# Patient Record
Sex: MALE | Race: WHITE | NOT HISPANIC OR LATINO | Employment: STUDENT | URBAN - NONMETROPOLITAN AREA
[De-identification: names, ages, dates, MRNs, and addresses within clinical notes are randomized per-mention and may not be internally consistent; named-entity substitution may affect disease eponyms.]

---
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
## 2023-04-03 ENCOUNTER — TELEPHONE (OUTPATIENT)
Dept: OBGYN CLINIC | Facility: HOSPITAL | Age: 18
End: 2023-04-03

## 2023-04-03 ENCOUNTER — APPOINTMENT (OUTPATIENT)
Dept: RADIOLOGY | Facility: CLINIC | Age: 18
End: 2023-04-03

## 2023-04-03 ENCOUNTER — OFFICE VISIT (OUTPATIENT)
Dept: URGENT CARE | Facility: CLINIC | Age: 18
End: 2023-04-03

## 2023-04-03 VITALS
WEIGHT: 135 LBS | BODY MASS INDEX: 20.46 KG/M2 | HEIGHT: 68 IN | HEART RATE: 70 BPM | RESPIRATION RATE: 18 BRPM | DIASTOLIC BLOOD PRESSURE: 61 MMHG | TEMPERATURE: 97.8 F | SYSTOLIC BLOOD PRESSURE: 124 MMHG | OXYGEN SATURATION: 100 %

## 2023-04-03 DIAGNOSIS — M79.642 HAND PAIN, LEFT: ICD-10-CM

## 2023-04-03 DIAGNOSIS — S63.616A SPRAIN OF RIGHT LITTLE FINGER, UNSPECIFIED SITE OF DIGIT, INITIAL ENCOUNTER: Primary | ICD-10-CM

## 2023-04-03 RX ORDER — ISOTRETINOIN 30 MG/1
30 CAPSULE ORAL 2 TIMES DAILY
COMMUNITY
Start: 2023-03-14

## 2023-04-03 NOTE — TELEPHONE ENCOUNTER
Patient is being referred to a orthopedics  Please schedule accordingly      [address]   [phone]

## 2023-04-03 NOTE — PATIENT INSTRUCTIONS
Rest, ice, compression, elevation  Finger splint or andriy tape  Gentle range of motion  Tylenol ibuprofen for pain  Orthopedics if symptoms or not improving

## 2023-04-03 NOTE — PROGRESS NOTES
[facility]        NAME: Lalitha Shelton is a 21 y o  male  : 2005    MRN: [id number]  DATE: April 3, 2023  TIME: 7:44 PM    Assessment and Plan   Sprain of right little finger, unspecified site of digit, initial encounter [H51 525F]  1  Sprain of right little finger, unspecified site of digit, initial encounter  Ambulatory Referral to Orthopedic Surgery    Orthopedic injury treatment    CANCELED: XR hand 3+ vw left            Patient Instructions   Rest, ice, compression, elevation  Finger splint or andriy tape  Gentle range of motion  Tylenol ibuprofen for pain  Orthopedics if symptoms or not improving  Follow up with PCP in 3-5 days  Proceed to  ER if symptoms worsen  Chief Complaint     Chief Complaint   Patient presents with   • Hand Injury     Pt reports playing football yesterday and had a friend accidentally kick hit left ring and pinky fingers  Today has pain with movement and tingling  History of Present Illness       Patient is a 59-year-old male with no significant past medical history presents the office complaining of right fifth digit pain, swelling, ecchymosis after playing football yesterday  States his friend actually kicked him in the hand  Unsure if it caused it to hyperextend, flex, or bend and an abnormal way  Pain is rated 6 out of 10 most noticeable to the proximal aspect  Reports occasional tingling without pain in his fourth digit  Not take anything for pain  He is right-hand dominant  Review of Systems   Review of Systems   Musculoskeletal: Positive for arthralgias and joint swelling  Neurological: Positive for numbness           Current Medications       Current Outpatient Medications:   •  ISOtretinoin (ACCUTANE) 30 MG capsule, Take 30 mg by mouth 2 (two) times a day, Disp: , Rfl:   •  clindamycin (CLINDAGEL) 1 % gel, Apply topically 2 (two) times a day (Patient not taking: Reported on 4/3/2023), Disp: , Rfl:   •  DOXYCYCLINE PO, Take by "mouth (Patient not taking: Reported on 4/3/2023), Disp: , Rfl:     Current Allergies     Allergies as of 04/03/2023 - Reviewed 04/03/2023   Allergen Reaction Noted   • Amoxicillin Rash 11/26/2019            The following portions of the patient's history were reviewed and updated as appropriate: allergies, current medications, past family history, past medical history, past social history, past surgical history and problem list      Past Medical History:   Diagnosis Date   • Arm fracture, left    • Foot fracture    • Strep throat        Past Surgical History:   Procedure Laterality Date   • TONSILLECTOMY         History reviewed  No pertinent family history  Medications have been verified  Objective   /61   Pulse 70   Temp 97 8 °F (36 6 °C)   Resp 18   Ht 5' 8\" (1 727 m)   Wt 61 2 kg (135 lb)   SpO2 100%   BMI 20 53 kg/m²   No LMP for male patient  Physical Exam     Physical Exam  Vitals and nursing note reviewed  Constitutional:       Appearance: He is well-developed  HENT:      Head: Normocephalic and atraumatic  Right Ear: External ear normal       Left Ear: External ear normal       Nose: Nose normal    Eyes:      General: Lids are normal       Conjunctiva/sclera: Conjunctivae normal    Musculoskeletal:      Comments: Swelling and ecchymosis to the proximal aspect of right fifth digit  TTP to PIP, proximal phalanx and MCP  Range of motion intact  Sensation intact to gross touch  No abnormalities or tenderness to fourth digit  Skin:     General: Skin is warm and dry  Capillary Refill: Capillary refill takes less than 2 seconds  Findings: No rash  Neurological:      Mental Status: He is alert  Right fifth digit x-ray: No evidence of acute osseous abnormalities  Radiology interpretation pending      Orthopedic injury treatment    Date/Time: 4/3/2023 6:17 PM  Performed by: Gina Olguin PA-C  Authorized by: Gina Olguin PA-C     Patient " Location:  Bedside  [place] Protocol:  Consent: Verbal consent obtained  Risks and benefits: risks, benefits and alternatives were discussed  Consent given by: patient  Patient understanding: patient states understanding of the procedure being performed  Patient consent: the patient's understanding of the procedure matches consent given  Patient identity confirmed: verbally with patient      Injury location:  Finger  Location details:  Right little finger  Injury type:   Soft tissue  Neurovascular status: Neurovascularly intact    Distal perfusion: normal    Neurological function: normal    Range of motion: reduced    Splint type:  Finger splint, static  Neurovascular status: Neurovascularly intact    Distal perfusion: normal    Neurological function: normal    Range of motion: unchanged    Patient tolerance:  Patient tolerated the procedure well with no immediate complications

## 2023-04-05 VITALS
DIASTOLIC BLOOD PRESSURE: 66 MMHG | HEART RATE: 65 BPM | WEIGHT: 137 LBS | TEMPERATURE: 98.9 F | HEIGHT: 68 IN | BODY MASS INDEX: 20.76 KG/M2 | SYSTOLIC BLOOD PRESSURE: 110 MMHG

## 2023-04-05 DIAGNOSIS — S63.636A SPRAIN OF INTERPHALANGEAL JOINT OF RIGHT LITTLE FINGER, INITIAL ENCOUNTER: Primary | ICD-10-CM

## 2023-04-05 NOTE — PROGRESS NOTES
"1  Sprain of interphalangeal joint of right little finger, initial encounter  Ambulatory Referral to Orthopedic Surgery    medial collateral ligament of PIP joint          No orders of the defined types were placed in this encounter  Imaging Studies (I personally reviewed images in PACS and report):    • X-ray right pinky digit 4/3/2023: No acute osseous abnormalities  No significant degenerative changes  Soft tissue swelling of the pinky finger  IMPRESSION:  • Acute right PIP medial collateral ligament pinky finger sprain secondary to being kicked while playing two hand football  • Progressively improving over time with resolution of swelling/ecchymosis and full range of motion of the digit on exam   Neurologically intact  Strength intact  Date of Injury: 4/2/2023  Follow up interval: 3 days    PLAN:    • Clinical exam and radiographic imaging reviewed with patient today, with impression as per above  I have discussed with the patient the pathophysiology of this diagnosis and reviewed how the examination correlates with this diagnosis  • Prior imaging reviewed with patient today as noted above  • Reassured him of his progressive improvement over time and that his injury is consistent with a collateral ligament sprain of his pinky finger at his PIP joint  • I recommended that he can return to using his right hand as tolerated and did provide coband/buddy taping of his digits as needed if he has difficulty gripping/pushing/pulling with his right hand  • Regarding pain control recommend  Use acetaminophen, NSAIDs, heat/ice therapy torments on/off, use of buddy taping until pain resolves  Home exericeses provided as well  • Declined need for work note  Return if symptoms worsen or fail to improve  Portions of the record may have been created with voice recognition software   Occasional wrong word or \"sound a like\" substitutions may have occurred due to the inherent limitations of voice " recognition software  Read the chart carefully and recognize, using context, where substitutions have occurred  I have spent a total time of 30 minutes on 04/05/23 in caring for this patient including Diagnostic results, Prognosis, Risks and benefits of tx options, Instructions for management, Patient and family education, Importance of tx compliance, Risk factor reductions, Impressions, Counseling / Coordination of care, Documenting in the medical record, Reviewing / ordering tests, medicine, procedures   and Obtaining or reviewing history    CHIEF COMPLAINT:  Chief Complaint   Patient presents with   • Right Little Finger - Pain         HPI:  Nicolasa Espinal is a 21 y o  male  who presents for       Visit 4/5/2023:  Initial evaluation of right little finger injury:  Precipitating injury on 4/3/2023 while playing two-handed football  He states he was attempting to do an touch someone when the opposing player kicked up his foot and struck his little finger  He denies a popping sensation but experience pain throughout his pinky digit along with swelling and eventual bruising  Due to the pain and discoloration, he went to urgent care and had imaging done as noted above  He reports today that there has been significant progressive improvement since his injury  He reports resolution of the swelling and bruising  He states his pain is mainly localized along his PIP joint  He feels that his range of motion has improved and he is able to make a fist again  He states his main limiting factor is when he is gripping with his hand as it does aggravate his PIP joint and mildly of his DIP joint  Denies use of any pain medications for this issue  Denies prior surgeries of this digit in the past   Reports experiencing tingling sensation of his finger at first but this has resolved            Medical, Surgical, Family, and Social History    Past Medical History:   Diagnosis Date   • Arm fracture, left    • Foot "fracture    • Strep throat      Past Surgical History:   Procedure Laterality Date   • TONSILLECTOMY       Social History   Social History     Substance and Sexual Activity   Alcohol Use Never     Social History     Substance and Sexual Activity   Drug Use Never     Social History     Tobacco Use   Smoking Status Never   • Passive exposure: Yes   Smokeless Tobacco Never     History reviewed  No pertinent family history  Allergies   Allergen Reactions   • Amoxicillin Rash          Physical Exam  /66   Pulse 65   Temp 98 9 °F (37 2 °C) (Temporal)   Ht 5' 8\" (1 727 m)   Wt 62 1 kg (137 lb)   BMI 20 83 kg/m²     Constitutional:  see vital signs  Gen: well-developed, normocephalic/atraumatic, well-groomed  Pulmonary/Chest: Effort normal  No respiratory distress  Ortho Exam   Right little finger exam:  On inspection there is no swelling, erythema, ecchymosis  Palpation, there is mild pain along the medial aspect of the PIP joint  But tolerable per patient  On valgus/varus stress testing, patient reports exacerbating pain over the medial collateral ligament of the P IP joint  Otherwise no increased laxity of the MCP/PIP/DIP joint  Full digit MCP/PIP/DIP motion without malrotation or digital scissoring  Thumb MCP/DIP intact with opposition  Cap refill less than 2 seconds    Other digits have full digit MCP/PIP/DIP motion without malrotation or digital scissoring       strength: 5/5      Procedures        "